# Patient Record
Sex: FEMALE | Race: AMERICAN INDIAN OR ALASKA NATIVE | NOT HISPANIC OR LATINO | URBAN - METROPOLITAN AREA
[De-identification: names, ages, dates, MRNs, and addresses within clinical notes are randomized per-mention and may not be internally consistent; named-entity substitution may affect disease eponyms.]

---

## 2021-03-10 VITALS — SYSTOLIC BLOOD PRESSURE: 132 MMHG | DIASTOLIC BLOOD PRESSURE: 80 MMHG | WEIGHT: 127 LBS

## 2022-05-15 ENCOUNTER — EMERGENCY (EMERGENCY)
Facility: HOSPITAL | Age: 57
LOS: 0 days | Discharge: HOME | End: 2022-05-15
Attending: EMERGENCY MEDICINE | Admitting: EMERGENCY MEDICINE
Payer: COMMERCIAL

## 2022-05-15 VITALS
RESPIRATION RATE: 18 BRPM | OXYGEN SATURATION: 100 % | HEART RATE: 74 BPM | SYSTOLIC BLOOD PRESSURE: 128 MMHG | HEIGHT: 60 IN | DIASTOLIC BLOOD PRESSURE: 71 MMHG | TEMPERATURE: 98 F

## 2022-05-15 DIAGNOSIS — M25.561 PAIN IN RIGHT KNEE: ICD-10-CM

## 2022-05-15 DIAGNOSIS — R55 SYNCOPE AND COLLAPSE: ICD-10-CM

## 2022-05-15 DIAGNOSIS — Z20.822 CONTACT WITH AND (SUSPECTED) EXPOSURE TO COVID-19: ICD-10-CM

## 2022-05-15 LAB
ALBUMIN SERPL ELPH-MCNC: 4.5 G/DL — SIGNIFICANT CHANGE UP (ref 3.5–5.2)
ALP SERPL-CCNC: 75 U/L — SIGNIFICANT CHANGE UP (ref 30–115)
ALT FLD-CCNC: 12 U/L — SIGNIFICANT CHANGE UP (ref 0–41)
ANION GAP SERPL CALC-SCNC: 12 MMOL/L — SIGNIFICANT CHANGE UP (ref 7–14)
AST SERPL-CCNC: 15 U/L — SIGNIFICANT CHANGE UP (ref 0–41)
BASOPHILS # BLD AUTO: 0.05 K/UL — SIGNIFICANT CHANGE UP (ref 0–0.2)
BASOPHILS NFR BLD AUTO: 0.6 % — SIGNIFICANT CHANGE UP (ref 0–1)
BILIRUB SERPL-MCNC: 0.3 MG/DL — SIGNIFICANT CHANGE UP (ref 0.2–1.2)
BUN SERPL-MCNC: 13 MG/DL — SIGNIFICANT CHANGE UP (ref 10–20)
CALCIUM SERPL-MCNC: 9.6 MG/DL — SIGNIFICANT CHANGE UP (ref 8.5–10.1)
CHLORIDE SERPL-SCNC: 103 MMOL/L — SIGNIFICANT CHANGE UP (ref 98–110)
CO2 SERPL-SCNC: 26 MMOL/L — SIGNIFICANT CHANGE UP (ref 17–32)
CREAT SERPL-MCNC: 0.6 MG/DL — LOW (ref 0.7–1.5)
EGFR: 104 ML/MIN/1.73M2 — SIGNIFICANT CHANGE UP
EOSINOPHIL # BLD AUTO: 0.04 K/UL — SIGNIFICANT CHANGE UP (ref 0–0.7)
EOSINOPHIL NFR BLD AUTO: 0.5 % — SIGNIFICANT CHANGE UP (ref 0–8)
GLUCOSE SERPL-MCNC: 127 MG/DL — HIGH (ref 70–99)
HCT VFR BLD CALC: 38.5 % — SIGNIFICANT CHANGE UP (ref 37–47)
HGB BLD-MCNC: 13 G/DL — SIGNIFICANT CHANGE UP (ref 12–16)
IMM GRANULOCYTES NFR BLD AUTO: 0.2 % — SIGNIFICANT CHANGE UP (ref 0.1–0.3)
LYMPHOCYTES # BLD AUTO: 1.32 K/UL — SIGNIFICANT CHANGE UP (ref 1.2–3.4)
LYMPHOCYTES # BLD AUTO: 15.6 % — LOW (ref 20.5–51.1)
MAGNESIUM SERPL-MCNC: 2.2 MG/DL — SIGNIFICANT CHANGE UP (ref 1.8–2.4)
MCHC RBC-ENTMCNC: 29.4 PG — SIGNIFICANT CHANGE UP (ref 27–31)
MCHC RBC-ENTMCNC: 33.8 G/DL — SIGNIFICANT CHANGE UP (ref 32–37)
MCV RBC AUTO: 87.1 FL — SIGNIFICANT CHANGE UP (ref 81–99)
MONOCYTES # BLD AUTO: 0.26 K/UL — SIGNIFICANT CHANGE UP (ref 0.1–0.6)
MONOCYTES NFR BLD AUTO: 3.1 % — SIGNIFICANT CHANGE UP (ref 1.7–9.3)
NEUTROPHILS # BLD AUTO: 6.77 K/UL — HIGH (ref 1.4–6.5)
NEUTROPHILS NFR BLD AUTO: 80 % — HIGH (ref 42.2–75.2)
NRBC # BLD: 0 /100 WBCS — SIGNIFICANT CHANGE UP (ref 0–0)
PLATELET # BLD AUTO: 263 K/UL — SIGNIFICANT CHANGE UP (ref 130–400)
POTASSIUM SERPL-MCNC: 4 MMOL/L — SIGNIFICANT CHANGE UP (ref 3.5–5)
POTASSIUM SERPL-SCNC: 4 MMOL/L — SIGNIFICANT CHANGE UP (ref 3.5–5)
PROT SERPL-MCNC: 7.3 G/DL — SIGNIFICANT CHANGE UP (ref 6–8)
RBC # BLD: 4.42 M/UL — SIGNIFICANT CHANGE UP (ref 4.2–5.4)
RBC # FLD: 12.1 % — SIGNIFICANT CHANGE UP (ref 11.5–14.5)
SARS-COV-2 RNA SPEC QL NAA+PROBE: SIGNIFICANT CHANGE UP
SODIUM SERPL-SCNC: 141 MMOL/L — SIGNIFICANT CHANGE UP (ref 135–146)
TROPONIN T SERPL-MCNC: <0.01 NG/ML — SIGNIFICANT CHANGE UP
WBC # BLD: 8.46 K/UL — SIGNIFICANT CHANGE UP (ref 4.8–10.8)
WBC # FLD AUTO: 8.46 K/UL — SIGNIFICANT CHANGE UP (ref 4.8–10.8)

## 2022-05-15 PROCEDURE — 93010 ELECTROCARDIOGRAM REPORT: CPT

## 2022-05-15 PROCEDURE — 99285 EMERGENCY DEPT VISIT HI MDM: CPT

## 2022-05-15 PROCEDURE — 71045 X-RAY EXAM CHEST 1 VIEW: CPT | Mod: 26

## 2022-05-15 RX ORDER — SODIUM CHLORIDE 9 MG/ML
1000 INJECTION, SOLUTION INTRAVENOUS ONCE
Refills: 0 | Status: COMPLETED | OUTPATIENT
Start: 2022-05-15 | End: 2022-05-15

## 2022-05-15 NOTE — ED PROVIDER NOTE - OBJECTIVE STATEMENT
Patient is a 56y F no pmhx presenting after a syncopal episode during [employer]. Patient says she felt a cramp in her leg while sitting, that spread up her leg and into her body, after which she felt faint. Patient's  states at this time she became "stiff" and was staring into space, with the whole episode lasting under a minute. Patient afterwards was not confused, and felt a little better after drinking some water that other's provided. States she did not eat breakfast today. Denies chest pain, shortness of breath, calf swelling, dizziness. Patient is able to ambulate afterwards without assistance.

## 2022-05-15 NOTE — ED ADULT TRIAGE NOTE - SPO2 (%)
[facility] is calling because the neurotrophin Flx pro needs PA. It needs to be  Expedited because the patient will need the injection soon. Please advise. 100

## 2022-05-15 NOTE — ED PROVIDER NOTE - PHYSICAL EXAMINATION
Vital Signs: I have reviewed the initial vital signs.  Constitutional: well-nourished, appears stated age, no acute distress.  HEENT: Airway patent, moist MM, EOMI, PERRLA.  CV: regular rate, regular rhythm, well-perfused extremities,   Lungs: Clear to ascultation bilaterally, no increased work of breathing.  ABD: Non-tender, Non-distended,  MSK: Neck supple, nontender, normal range of motion, able to ambulate  INTEG: Skin warm, dry, no rash.  NEURO: A&Ox3, moving all extremities, normal speech  PSYCH: Calm, cooperative, normal affect and interaction.

## 2022-05-15 NOTE — ED PROVIDER NOTE - NSFOLLOWUPINSTRUCTIONS_ED_ALL_ED_FT
What is syncope?  "Syncope" is the medical term for fainting. After fainting, a person quickly "comes to" and is OK again. Syncope is very common. About 1 out of every 3 people has it at some point in life. In many cases, syncope is nothing to worry about.    What causes syncope?  Syncope happens when the brain temporarily doesn't get enough blood. One of the most common reasons this happens is called "vasovagal syncope." If you have vasovagal syncope, your body has a reaction in which your heart beats too slowly or your blood vessels expand (or both). This can happen for lots of different kinds of reasons. People can have vasovagal syncope if they:    ?Have stress from fear or pain (for example, because they are injured or have blood taken for tests)    ?Stand for too long or are over-tired or overheated    ?Have an unusual reaction to urinating, coughing, or other body functions    Sometimes vasovagal syncope happens with no clear cause.    People can also have syncope that is not vasovagal. This can happen due to the following problems:    ?The heart beats too quickly or too slowly because of problems with the heart's electrical system or because of side effects from some medicines.    ?Something blocks the flow of blood in the heart. This can happen in people who have conditions called "aortic stenosis" (a valve disease) or "hypertrophic cardiomyopathy" (a heart muscle disease).    ?Your blood pressure drops when you stand or sit up. That can happen if you:    •Do not drink enough water    •Take certain medicines that cause your blood pressure to drop    •Drink too much alcohol    •Lose a lot of blood (for example, if you get hurt)    •Have a medical condition that affects your blood pressure    Is syncope dangerous?  In many cases it is not dangerous. But it can be dangerous if you fall and hurt yourself when you faint. It can also be dangerous if you faint while driving. To be safe, check with your doctor or nurse before you start driving again after you faint.    Should I see a doctor or nurse?  Yes. Anyone who faints should see a doctor or nurse. Most cases of syncope are not serious. But people can get hurt when they faint. Plus, in some cases syncope is caused by a serious medical condition that should be treated. Knowing what caused you to faint can help you prevent it from happening again.    Tell your doctor or nurse what happened before, during, and after you fainted. If someone was with you when you fainted, that person might be able to tell you what happened. The following information is helpful:    ?What were you doing before you passed out?    ?How were you feeling before you passed out?    ?How long were you passed out?    ?How well did you recover?    ?Any past history of fainting?    ?A list of the medicines you take    ?Any medical conditions you might have    Your doctor or nurse will ask you a few questions and do an exam. During the exam, the doctor or nurse might:    ?Check your blood pressure and heart rate when you are lying down, sitting, or standing    ?Listen to your heart to check whether something might be wrong with your heart valves or heart muscle    Will I need tests?  Yes. Your doctor will do a test called an electrocardiogram (also called an "ECG"). For this test, the doctor will put sticky pads on your chest, belly, arms, and legs. Long, thin wires connect the pads to a machine (figure 1). The device records the electrical activity in your heart. This can show if the pattern of your heartbeats is abnormal.    You might get other tests, too. These could include 1 or more of the following:    ?Echocardiogram (also called an "echo") – This test uses sound waves to create an image of the heart (figure 2). It allows the doctors to measure the walls and chambers of the heart, see how the heart is pumping and check how the heart valves are working. Heart valves are flaps of tissue that open and close like swinging doors. They help keep blood moving in one direction.    ?Carotid sinus massage – For this test, a doctor presses on a blood vessel in your neck (figure 3) while watching your electrocardiogram. This can show if your blood vessel is too sensitive to pressure.    ?Home heart monitor – For home monitoring, you might wear or carry a device around at home. You will keep doing normal activities. One type of monitor records all your heart beats for 1 or 2 days (figure 4). With others, you push a button to record heart beats when you feel symptoms (figure 5). There is also a monitor you can wear in the form of a sticky patch that goes on your chest. It does not require separate wires or battery packs. You wear the patch for up to 14 days.    Can syncope be prevented?  You might be able to reduce your chances of fainting again if you:    ?Learn what causes your syncope:    •If an activity or condition causes your syncope, you can try to avoid it.    •If a medicine causes your syncope, your doctor can help you find an alternative.    •If a heart condition is causing your syncope, your doctor can suggest a treatment.    ?Lie down with your feet up when you feel like you might faint.    How is syncope treated?  That depends on what is causing your syncope. In many cases, the main treatment is to avoid the situations that cause syncope.    In less common cases, other treatment might be needed. For example, you might need a pacemaker if your heart beats too slowly and this causes syncope. A pacemaker is a device that is put under your skin. Thin wires attach them to your heart. They help the heart beat at a normal speed or in a regular pattern.

## 2022-05-15 NOTE — ED PROVIDER NOTE - CARE PROVIDER_API CALL
OANH ARAMBULA  Internal Medicine  [address] [place] [address]  Phone: [phone]  Fax: [phone]  Established Patient  Follow Up Time:

## 2022-05-15 NOTE — ED PROVIDER NOTE - PATIENT PORTAL LINK FT
You can access the FollowMyHealth Patient Portal offered by [facility] by registering at the following website: [email or web address]. By joining [employer]’s FollowMyHealth portal, you will also be able to view your health information using other applications (apps) compatible with our system.

## 2022-05-15 NOTE — ED PROVIDER NOTE - PROGRESS NOTE DETAILS
MM: Per [place] Syncope Risk rule, patient is low risk, suggestive of vasovagal syncope. MM: Patient is well appearing, NAD, afebrile, hemodynamically stable. Any available tests and studies were discussed with patient and family. Discharged with instructions in further symptomatic care, return precautions, and need for PMD f/u.

## 2022-05-15 NOTE — ED ADULT NURSE NOTE - OBJECTIVE STATEMENT
Pt c/o syncopal episode, pt states she was at [employer] and had a muscle spasm in her right knee that shot up her body causing her to have a syncopal episode. Denies head trauma, n/v/d, cp, sob, back pain, abd pain, weakness, numbness/tingling.

## 2022-05-15 NOTE — ED PROVIDER NOTE - CLINICAL SUMMARY MEDICAL DECISION MAKING FREE TEXT BOX
56-year-old female presents to the ED after syncopal episode at [facility].  Patient reports she had sharp shooting right knee pain up the body at which point she felt she tensed up and passed out.  Witnessed by  at bedside who noticed patient had a moment where she froze up and fainted with some tremors.  Patient reports regaining consciousness fairly swiftly.  Currently denies any headache, nausea, vomiting, chest pain, shortness of breath, fevers, chills, abdominal pain.  Physical exam is unremarkable.  Right lower extremity does not have any swelling with popliteal pulses and distal pulses palpated.  Full range of motion of the extremity with negative anterior and posterior drawer test.  Unable to reproduce pain patient fell.  Concern for electrolyte abnormalities so we obtained labs which were noted to be within normal limits.  [place] risk score for syncope applied was low risk.  Currently patient is well-appearing.  Will follow-up with outpatient cardiology that the patient's  is following.  Discharged home with return precautions.  Patient understood plan.

## 2022-05-15 NOTE — ED ADULT NURSE NOTE - NSIMPLEMENTINTERV_GEN_ALL_ED
Implemented All Universal Safety Interventions:  [place] to call system. Call bell, personal items and telephone within reach. Instruct patient to call for assistance. Room bathroom lighting operational. Non-slip footwear when patient is off stretcher. Physically safe environment: no spills, clutter or unnecessary equipment. Stretcher in lowest position, wheels locked, appropriate side rails in place.

## 2022-06-08 PROBLEM — Z00.00 ENCOUNTER FOR PREVENTIVE HEALTH EXAMINATION: Status: ACTIVE | Noted: 2022-06-08

## 2022-06-09 DIAGNOSIS — R92.8 OTHER ABNORMAL AND INCONCLUSIVE FINDINGS ON DIAGNOSTIC IMAGING OF BREAST: ICD-10-CM

## 2022-10-03 DIAGNOSIS — Z82.49 FAMILY HISTORY OF ISCHEMIC HEART DISEASE AND OTHER DISEASES OF THE CIRCULATORY SYSTEM: ICD-10-CM

## 2022-10-03 DIAGNOSIS — Z78.9 OTHER SPECIFIED HEALTH STATUS: ICD-10-CM

## 2022-10-03 DIAGNOSIS — Z83.3 FAMILY HISTORY OF DIABETES MELLITUS: ICD-10-CM

## 2022-10-03 DIAGNOSIS — Z78.0 ASYMPTOMATIC MENOPAUSAL STATE: ICD-10-CM

## 2022-10-03 DIAGNOSIS — Z84.2 FAMILY HISTORY OF OTHER DISEASES OF THE GENITOURINARY SYSTEM: ICD-10-CM

## 2022-10-03 DIAGNOSIS — Z82.5 FAMILY HISTORY OF ASTHMA AND OTHER CHRONIC LOWER RESPIRATORY DISEASES: ICD-10-CM

## 2022-10-03 DIAGNOSIS — Z82.62 FAMILY HISTORY OF OSTEOPOROSIS: ICD-10-CM

## 2022-10-03 DIAGNOSIS — Z80.3 FAMILY HISTORY OF MALIGNANT NEOPLASM OF BREAST: ICD-10-CM

## 2022-10-03 DIAGNOSIS — Z92.89 PERSONAL HISTORY OF OTHER MEDICAL TREATMENT: ICD-10-CM

## 2023-07-19 PROBLEM — Z78.9 OTHER SPECIFIED HEALTH STATUS: Chronic | Status: ACTIVE | Noted: 2022-05-15

## 2023-08-02 ENCOUNTER — APPOINTMENT (OUTPATIENT)
Dept: OBGYN | Facility: CLINIC | Age: 58
End: 2023-08-02
Payer: COMMERCIAL

## 2023-08-02 VITALS
HEIGHT: 60 IN | SYSTOLIC BLOOD PRESSURE: 133 MMHG | HEART RATE: 71 BPM | BODY MASS INDEX: 25.32 KG/M2 | DIASTOLIC BLOOD PRESSURE: 86 MMHG | WEIGHT: 129 LBS

## 2023-08-02 DIAGNOSIS — Z01.419 ENCOUNTER FOR GYNECOLOGICAL EXAMINATION (GENERAL) (ROUTINE) W/OUT ABNORMAL FINDINGS: ICD-10-CM

## 2023-08-02 LAB
BILIRUB UR QL STRIP: NORMAL
CLARITY UR: CLEAR
COLLECTION METHOD: NORMAL
GLUCOSE UR-MCNC: NORMAL
HCG UR QL: 0.2 EU/DL
HGB UR QL STRIP.AUTO: NORMAL
KETONES UR-MCNC: NORMAL
LEUKOCYTE ESTERASE UR QL STRIP: NORMAL
NITRITE UR QL STRIP: NORMAL
PH UR STRIP: 5.5
PROT UR STRIP-MCNC: NORMAL
SP GR UR STRIP: 1

## 2023-08-02 PROCEDURE — 99396 PREV VISIT EST AGE 40-64: CPT

## 2023-08-02 PROCEDURE — 81003 URINALYSIS AUTO W/O SCOPE: CPT | Mod: QW

## 2023-08-10 LAB
CYTOLOGY CVX/VAG DOC THIN PREP: ABNORMAL
HPV HIGH+LOW RISK DNA PNL CVX: NOT DETECTED

## 2024-01-29 ENCOUNTER — NON-APPOINTMENT (OUTPATIENT)
Age: 59
End: 2024-01-29

## 2024-10-24 ENCOUNTER — APPOINTMENT (OUTPATIENT)
Dept: OBGYN | Facility: CLINIC | Age: 59
End: 2024-10-24
Payer: COMMERCIAL

## 2024-10-24 VITALS — HEIGHT: 60 IN | WEIGHT: 131 LBS | BODY MASS INDEX: 25.72 KG/M2

## 2024-10-24 DIAGNOSIS — Z01.419 ENCOUNTER FOR GYNECOLOGICAL EXAMINATION (GENERAL) (ROUTINE) W/OUT ABNORMAL FINDINGS: ICD-10-CM

## 2024-10-24 LAB
BILIRUB UR QL STRIP: NORMAL
CLARITY UR: CLEAR
COLLECTION METHOD: NORMAL
GLUCOSE UR-MCNC: NORMAL
HCG UR QL: 0.2 EU/DL
HGB UR QL STRIP.AUTO: NORMAL
KETONES UR-MCNC: NORMAL
LEUKOCYTE ESTERASE UR QL STRIP: NORMAL
NITRITE UR QL STRIP: NORMAL
PH UR STRIP: 5.5
PROT UR STRIP-MCNC: NORMAL
SP GR UR STRIP: 1.02

## 2024-10-24 PROCEDURE — 99396 PREV VISIT EST AGE 40-64: CPT

## 2024-10-24 PROCEDURE — 81003 URINALYSIS AUTO W/O SCOPE: CPT | Mod: QW

## 2024-10-28 LAB — HPV HIGH+LOW RISK DNA PNL CVX: NOT DETECTED

## 2024-10-31 LAB — CYTOLOGY CVX/VAG DOC THIN PREP: NORMAL
